# Patient Record
Sex: MALE | Race: WHITE | NOT HISPANIC OR LATINO | Employment: FULL TIME | URBAN - METROPOLITAN AREA
[De-identification: names, ages, dates, MRNs, and addresses within clinical notes are randomized per-mention and may not be internally consistent; named-entity substitution may affect disease eponyms.]

---

## 2024-12-15 ENCOUNTER — APPOINTMENT (OUTPATIENT)
Dept: RADIOLOGY | Facility: MEDICAL CENTER | Age: 59
End: 2024-12-15
Attending: EMERGENCY MEDICINE
Payer: COMMERCIAL

## 2024-12-15 ENCOUNTER — HOSPITAL ENCOUNTER (EMERGENCY)
Facility: MEDICAL CENTER | Age: 59
End: 2024-12-15
Attending: EMERGENCY MEDICINE
Payer: COMMERCIAL

## 2024-12-15 VITALS
WEIGHT: 186.73 LBS | DIASTOLIC BLOOD PRESSURE: 88 MMHG | BODY MASS INDEX: 27.58 KG/M2 | TEMPERATURE: 98.2 F | HEART RATE: 70 BPM | OXYGEN SATURATION: 98 % | RESPIRATION RATE: 16 BRPM | SYSTOLIC BLOOD PRESSURE: 138 MMHG
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
## 2024-12-15 DIAGNOSIS — S66.111A: ICD-10-CM

## 2024-12-15 PROCEDURE — 99284 EMERGENCY DEPT VISIT MOD MDM: CPT

## 2024-12-15 PROCEDURE — 73130 X-RAY EXAM OF HAND: CPT | Mod: LT

## 2024-12-15 ASSESSMENT — PAIN DESCRIPTION - PAIN TYPE: TYPE: ACUTE PAIN
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
## 2024-12-16 NOTE — DISCHARGE INSTRUCTIONS
Your x-rays did not show any fractures in the left hand or fingers.  You likely have a strain of the tendons of the index finger.  This should get better over the next week.  If you are still having pain in a week from now follow-up with [facility] orthopedic center.  Use ibuprofen and Tylenol to help with any discomfort.

## 2024-12-16 NOTE — ED NOTES
Pt ambulated to room from [facility]. Changed into gown. Agree with triage note. Chart up for ERP. Call light in reach.

## 2025-03-27 PROBLEM — M65.322 TRIGGER FINGER, LEFT INDEX FINGER: Status: ACTIVE | Noted: 2025-03-27

## 2025-03-27 NOTE — H&P (VIEW-ONLY)
HISTORY OF PRESENT ILLNESS:   Pranay is a 60 y.o. who presents to clinic for follow-up regarding left index trigger finger.He was seen by myself on 1/23/2025 and had an injection.  Since that time patient states, the injection worked for about 6-8 weeks. He now has increase in pain and stiffness in the morning and active triggering.      REVIEW OF SYSTEMS: Comprehensive review of systems is negative with the   exception of the aforementioned HPI.    PHYSICAL EXAMINATION:   General: Well-developed, well-nourished, in no acute distress.  Alert and oriented x 3   HEENT: Atraumatic, normocephalic, neck no visible thyromegaly  CV: Acyanotic  Pulmonary: Nonlabored operations, not in respiratory distress, symmetric chest rise  Abdomen: Nondistended  Musculoskeletal:  2+radial pulse  SILT radial/median/ulnar nerves  5/5 /intrinsic/wrist flexion-extension  Tender to palpation at the A1 pulley of the left index finger with active catching.      Encounter Diagnoses:   Trigger finger, left index finger    ASSESSMENT AND PLAN:    -Recurrent left index trigger finger  -Thorough discussion regarding diagnosis and treatment options.  We discussed repeat injection, nighttime bracing, or surgical release of the A1 pulley.  Patient would like to proceed with surgical release of the A1 pulley.  -The risks, benefits, and alternatives of surgical release of the A1 pulley was discussed at length.  These risks include but are not limited to: infection, pain, swelling, damage to surrounding neurovascular structures, recurrence of trigger finger, although this is unlikely, unsightly scarring, as well as the risks associated with anesthesia.  Patient understands these risks and would like to proceed.  They had an opportunity to ask questions, and all of their questions were answered to the best my ability.      Patient verbalized agreement and understanding with the plan.  They were advised to contact clinic if symptoms worsen or they have  any additional questions or concerns. All of their questions were answered to the best of my ability and they verbalized understanding.    Rosalba Clancy M.D.    ---  Please note that this dictation was created using voice recognition software. I have made every reasonable attempt to correct obvious errors, but I expect that there are errors of grammar and possibly content that I did not discover before finalizing the note.     Due to the word recognition system there will be spelling errors and word recognition errors that have no clinical impact on the intent of the note and treatment of the patient. I will attempt to correct the errors as the visit progresses however there will be missed attempts and I do reserve the right to correct the errors at any time in the future.

## 2025-04-02 ENCOUNTER — APPOINTMENT (OUTPATIENT)
Dept: ADMISSIONS | Facility: MEDICAL CENTER | Age: 60
End: 2025-04-02
Attending: ORTHOPAEDIC SURGERY
Payer: COMMERCIAL

## 2025-04-09 ENCOUNTER — PRE-ADMISSION TESTING (OUTPATIENT)
Dept: ADMISSIONS | Facility: MEDICAL CENTER | Age: 60
End: 2025-04-09
Attending: ORTHOPAEDIC SURGERY
Payer: COMMERCIAL

## 2025-04-18 ENCOUNTER — ANESTHESIA EVENT (OUTPATIENT)
Dept: SURGERY | Facility: MEDICAL CENTER | Age: 60
End: 2025-04-18
Payer: COMMERCIAL

## 2025-04-18 ENCOUNTER — ANESTHESIA (OUTPATIENT)
Dept: SURGERY | Facility: MEDICAL CENTER | Age: 60
End: 2025-04-18
Payer: COMMERCIAL

## 2025-04-18 ENCOUNTER — HOSPITAL ENCOUNTER (OUTPATIENT)
Facility: MEDICAL CENTER | Age: 60
End: 2025-04-18
Attending: ORTHOPAEDIC SURGERY | Admitting: ORTHOPAEDIC SURGERY
Payer: COMMERCIAL

## 2025-04-18 VITALS
HEIGHT: 69 IN | BODY MASS INDEX: 27.17 KG/M2 | TEMPERATURE: 96.2 F | HEART RATE: 58 BPM | RESPIRATION RATE: 18 BRPM | DIASTOLIC BLOOD PRESSURE: 92 MMHG | SYSTOLIC BLOOD PRESSURE: 155 MMHG | WEIGHT: 183.42 LBS | OXYGEN SATURATION: 94 %

## 2025-04-18 PROCEDURE — 160015 HCHG STAT PREOP MINUTES: Performed by: ORTHOPAEDIC SURGERY

## 2025-04-18 PROCEDURE — 160048 HCHG OR STATISTICAL LEVEL 1-5: Performed by: ORTHOPAEDIC SURGERY

## 2025-04-18 PROCEDURE — 160009 HCHG ANES TIME/MIN: Performed by: ORTHOPAEDIC SURGERY

## 2025-04-18 PROCEDURE — 26055 INCISE FINGER TENDON SHEATH: CPT | Mod: F1 | Performed by: ORTHOPAEDIC SURGERY

## 2025-04-18 PROCEDURE — 160025 RECOVERY II MINUTES (STATS): Performed by: ORTHOPAEDIC SURGERY

## 2025-04-18 PROCEDURE — 160002 HCHG RECOVERY MINUTES (STAT): Performed by: ORTHOPAEDIC SURGERY

## 2025-04-18 PROCEDURE — 700105 HCHG RX REV CODE 258: Performed by: ORTHOPAEDIC SURGERY

## 2025-04-18 PROCEDURE — 64702 REVISE FINGER/TOE NERVE: CPT | Mod: F1 | Performed by: ORTHOPAEDIC SURGERY

## 2025-04-18 PROCEDURE — 160035 HCHG PACU - 1ST 60 MINS PHASE I: Performed by: ORTHOPAEDIC SURGERY

## 2025-04-18 PROCEDURE — 160028 HCHG SURGERY MINUTES - 1ST 30 MINS LEVEL 3: Performed by: ORTHOPAEDIC SURGERY

## 2025-04-18 PROCEDURE — 160046 HCHG PACU - 1ST 60 MINS PHASE II: Performed by: ORTHOPAEDIC SURGERY

## 2025-04-18 PROCEDURE — 700101 HCHG RX REV CODE 250: Performed by: ANESTHESIOLOGY

## 2025-04-18 PROCEDURE — 700111 HCHG RX REV CODE 636 W/ 250 OVERRIDE (IP): Mod: JZ | Performed by: ANESTHESIOLOGY

## 2025-04-18 PROCEDURE — 160039 HCHG SURGERY MINUTES - EA ADDL 1 MIN LEVEL 3: Performed by: ORTHOPAEDIC SURGERY

## 2025-04-18 PROCEDURE — 700111 HCHG RX REV CODE 636 W/ 250 OVERRIDE (IP): Performed by: ORTHOPAEDIC SURGERY

## 2025-04-18 RX ORDER — DIPHENHYDRAMINE HYDROCHLORIDE 50 MG/ML
12.5 INJECTION, SOLUTION INTRAMUSCULAR; INTRAVENOUS
Status: DISCONTINUED | OUTPATIENT
Start: 2025-04-18 | End: 2025-04-18 | Stop reason: HOSPADM

## 2025-04-18 RX ORDER — SODIUM CHLORIDE, SODIUM LACTATE, POTASSIUM CHLORIDE, CALCIUM CHLORIDE 600; 310; 30; 20 MG/100ML; MG/100ML; MG/100ML; MG/100ML
INJECTION, SOLUTION INTRAVENOUS CONTINUOUS
Status: DISCONTINUED | OUTPATIENT
Start: 2025-04-18 | End: 2025-04-18 | Stop reason: HOSPADM

## 2025-04-18 RX ORDER — IPRATROPIUM BROMIDE AND ALBUTEROL SULFATE 2.5; .5 MG/3ML; MG/3ML
3 SOLUTION RESPIRATORY (INHALATION)
Status: DISCONTINUED | OUTPATIENT
Start: 2025-04-18 | End: 2025-04-18 | Stop reason: HOSPADM

## 2025-04-18 RX ORDER — HALOPERIDOL 5 MG/ML
1 INJECTION INTRAMUSCULAR
Status: DISCONTINUED | OUTPATIENT
Start: 2025-04-18 | End: 2025-04-18 | Stop reason: HOSPADM

## 2025-04-18 RX ORDER — CEFAZOLIN SODIUM 1 G/3ML
INJECTION, POWDER, FOR SOLUTION INTRAMUSCULAR; INTRAVENOUS PRN
Status: DISCONTINUED | OUTPATIENT
Start: 2025-04-18 | End: 2025-04-18 | Stop reason: SURG

## 2025-04-18 RX ORDER — OXYCODONE HCL 5 MG/5 ML
10 SOLUTION, ORAL ORAL
Status: DISCONTINUED | OUTPATIENT
Start: 2025-04-18 | End: 2025-04-18 | Stop reason: HOSPADM

## 2025-04-18 RX ORDER — HYDROMORPHONE HYDROCHLORIDE 1 MG/ML
1 INJECTION, SOLUTION INTRAMUSCULAR; INTRAVENOUS; SUBCUTANEOUS
Status: DISCONTINUED | OUTPATIENT
Start: 2025-04-18 | End: 2025-04-18 | Stop reason: HOSPADM

## 2025-04-18 RX ORDER — MIDAZOLAM HYDROCHLORIDE 1 MG/ML
1 INJECTION INTRAMUSCULAR; INTRAVENOUS
Status: DISCONTINUED | OUTPATIENT
Start: 2025-04-18 | End: 2025-04-18 | Stop reason: HOSPADM

## 2025-04-18 RX ORDER — HYDROMORPHONE HYDROCHLORIDE 1 MG/ML
0.4 INJECTION, SOLUTION INTRAMUSCULAR; INTRAVENOUS; SUBCUTANEOUS
Status: DISCONTINUED | OUTPATIENT
Start: 2025-04-18 | End: 2025-04-18 | Stop reason: HOSPADM

## 2025-04-18 RX ORDER — CEFAZOLIN SODIUM 1 G/3ML
2 INJECTION, POWDER, FOR SOLUTION INTRAMUSCULAR; INTRAVENOUS ONCE
Status: DISCONTINUED | OUTPATIENT
Start: 2025-04-18 | End: 2025-04-18 | Stop reason: HOSPADM

## 2025-04-18 RX ORDER — ONDANSETRON 2 MG/ML
4 INJECTION INTRAMUSCULAR; INTRAVENOUS
Status: DISCONTINUED | OUTPATIENT
Start: 2025-04-18 | End: 2025-04-18 | Stop reason: HOSPADM

## 2025-04-18 RX ORDER — OXYCODONE HCL 5 MG/5 ML
5 SOLUTION, ORAL ORAL
Status: DISCONTINUED | OUTPATIENT
Start: 2025-04-18 | End: 2025-04-18 | Stop reason: HOSPADM

## 2025-04-18 RX ORDER — FEXOFENADINE HCL 180 MG/1
180 TABLET ORAL DAILY
COMMUNITY

## 2025-04-18 RX ORDER — LIDOCAINE HYDROCHLORIDE 20 MG/ML
INJECTION, SOLUTION EPIDURAL; INFILTRATION; INTRACAUDAL; PERINEURAL PRN
Status: DISCONTINUED | OUTPATIENT
Start: 2025-04-18 | End: 2025-04-18 | Stop reason: SURG

## 2025-04-18 RX ORDER — HYDROMORPHONE HYDROCHLORIDE 1 MG/ML
0.2 INJECTION, SOLUTION INTRAMUSCULAR; INTRAVENOUS; SUBCUTANEOUS
Status: DISCONTINUED | OUTPATIENT
Start: 2025-04-18 | End: 2025-04-18 | Stop reason: HOSPADM

## 2025-04-18 RX ORDER — BUPIVACAINE HYDROCHLORIDE 2.5 MG/ML
INJECTION, SOLUTION EPIDURAL; INFILTRATION; INTRACAUDAL; PERINEURAL
Status: DISCONTINUED | OUTPATIENT
Start: 2025-04-18 | End: 2025-04-18 | Stop reason: HOSPADM

## 2025-04-18 RX ORDER — KETOROLAC TROMETHAMINE 15 MG/ML
INJECTION, SOLUTION INTRAMUSCULAR; INTRAVENOUS PRN
Status: DISCONTINUED | OUTPATIENT
Start: 2025-04-18 | End: 2025-04-18 | Stop reason: SURG

## 2025-04-18 RX ORDER — MEPERIDINE HYDROCHLORIDE 25 MG/ML
12.5 INJECTION INTRAMUSCULAR; INTRAVENOUS; SUBCUTANEOUS
Status: DISCONTINUED | OUTPATIENT
Start: 2025-04-18 | End: 2025-04-18 | Stop reason: HOSPADM

## 2025-04-18 RX ADMIN — PROPOFOL 20 MG: 10 INJECTION, EMULSION INTRAVENOUS at 09:03

## 2025-04-18 RX ADMIN — CEFAZOLIN 2 G: 1 INJECTION, POWDER, FOR SOLUTION INTRAMUSCULAR; INTRAVENOUS at 08:47

## 2025-04-18 RX ADMIN — PROPOFOL 20 MG: 10 INJECTION, EMULSION INTRAVENOUS at 09:07

## 2025-04-18 RX ADMIN — LIDOCAINE HYDROCHLORIDE 50 MG: 20 INJECTION, SOLUTION EPIDURAL; INFILTRATION; INTRACAUDAL; PERINEURAL at 08:54

## 2025-04-18 RX ADMIN — PROPOFOL 20 MG: 10 INJECTION, EMULSION INTRAVENOUS at 08:59

## 2025-04-18 RX ADMIN — SODIUM CHLORIDE, POTASSIUM CHLORIDE, SODIUM LACTATE AND CALCIUM CHLORIDE: 600; 310; 30; 20 INJECTION, SOLUTION INTRAVENOUS at 08:45

## 2025-04-18 RX ADMIN — PROPOFOL 20 MG: 10 INJECTION, EMULSION INTRAVENOUS at 09:11

## 2025-04-18 RX ADMIN — KETOROLAC TROMETHAMINE 15 MG: 15 INJECTION, SOLUTION INTRAMUSCULAR; INTRAVENOUS at 09:28

## 2025-04-18 RX ADMIN — PROPOFOL 120 MG: 10 INJECTION, EMULSION INTRAVENOUS at 08:54

## 2025-04-18 ASSESSMENT — PAIN SCALES - GENERAL: PAIN_LEVEL: 0

## 2025-04-18 NOTE — INTERVAL H&P NOTE
Consented Procedure: Left index finger A1 pulley release and radial digital nerve neuroma excision vs exploration  I have examined the patient, provided the risks, benefits, and alternatives to the procedure(s) indicated on the signed consent form, and the patient wishes to proceed.    H&P updated. The patient was examined and patient has a left index radial digital nerve mass that is exquisitely tender to palpation. We discussed that this area could be a post-traumatic neuroma and the risks/benefits/alternatives to nerve exploration and possible neuroma excision. Patient would like to proceed with both left index finger A1 pulley release and radial digital nerve exploration with possible neuroma excision.       Rosalba Clancy M.D.  04/18/25 7:24 AM

## 2025-04-18 NOTE — OR NURSING
0931: Pt arrived via gurney with Dr. Clancy, anesthesia and RN. Report received. See flowsheet for VS. Dressing CDI. Pt awake and alert. Orders reviewed and initiated.   1010: Dr. Tucker notified of HTN. Ok to proceed with discharge.   1019: Report called to Thu, RN. All questions answered. VSS. Dressing CDI. No patient distress. Alert and oriented x4. Pt transported to phase 2 in stable condition on room air with no personal belongings. Family updated.

## 2025-04-18 NOTE — PROGRESS NOTES
Medication history reviewed with PT at bedside    [facility] is complete per PT reporting    Allergies reviewed.     Patient denies any outpatient antibiotics in the last 30 days.     Patient is not taking anticoagulants.    Dispense history is not available in [facility].    Preferred pharmacy for this visit - [facility] on [address] ([phone])

## 2025-04-18 NOTE — LETTER
March 28, 2025    Patient Name: Pranay Foster  Surgeon Name: Rosalba Clancy M.D.  Surgery Facility: [facility] ([address] [place] [address])  Surgery Date: 4/18/2025    The time of your surgery is not final and may change up to and until the day of your surgery. You will be contacted 24-48 hours prior to your surgery date with your check-in and surgery time.    If you will not be at one of the below numbers please call the surgery scheduler at  [phone]  Preferred Phone: [phone]    BEFORE YOUR SURGERY   Pre Registration and/or Lab Work must be done within and no earlier than 28 days prior to your surgery date. Your scheduled facility will contact you regarding all required preregistration and/or lab work. If you have not been contacted within 7 days of your scheduled procedure please call [facility] at [phone] for an appointment as soon as possible.    DAY OF YOUR SURGERY  Nothing to eat or drink after midnight     Refrain from smoking any substance after midnight prior to surgery. Smoking may interfere with the anesthetic and frequently produces nausea during the recovery period.    Continue taking all lifesaving medications. Including the morning of your surgery with small sip of water.    Please do NOT take on the day of surgery:  Diuretics: examples- furosemide (Lasix), spironolactone, hydrochlorothiazide  ACE-inhibitors: examples- lisinopril, ramipril, enalapril  “ARBs”: examples- losartan, Olmesartan, valsartan    Please arrive at the hospital/surgery center at the check-in time provided.     An adult will need to bring you and take you home after your surgery.     AFTER YOUR SURGERY  Post op Appointment:   Date: 05/01/25   Time: 11:00 AM   With: Rosalba Clancy MD   Location: [address] [place] [id number]    - Post Surgery - You will need someone to drive you home  - Post Surgery - You will need someone to stay with you for 24 hours     TIME OFF WORK  [place] or  Disability forms can be faxed directly to: [phone] or you may drop them off at [address] [place] [address]. Our office charges a $35.00 fee per form. Forms will be completed within 10 business days of drop off and payment received. For the status of your forms you may contact our disability office directly [phone].    MEDICATION INSTRUCTIONS **Please read section completely**  The following medications should be stopped a minimum of 10 days prior to surgery:  All over the counter, Supplements & Herbal medications    Anorectics: Phentermine (Adipex-P, Lomaira and Suprenza), Phentermine-topiramate (Qsymia), Bupropion-naltrexone (Contrave)    **If you are on Bupropion for anxiety/depression, please continue this medication up until the day of surgery.     Opiod Partial Agonists/Opioid Antagonists: Buprenorphine (Suboxone, Belbuca, Butrans, Probuphine Implant, Sublocade), Naltrexone (ReVia, Vivitrol), Naloxone    Amphetamines: Dextroamphetamine/Amphetamine (Adderall, Mydayis), Methylphenidate Hydrochloride (Concerta, Metadate, Methylin, Ritalin)    The following medications should be stopped 5 days prior to surgery:  Blood Thinners: Any Aspirin, Aspirin products, anti-inflammatories such as ibuprofen and any blood thinners such as Coumadin and Plavix. Please consult your prescribing physician if you are on life saving blood thinners, in regards to when to stop medications prior to surgery.     The following medications should be stopped a minimum of 3 days prior to surgery:  PDE-5 inhibitors: Sildenafil (Viagra), Tadalafil (Cialis), Vardenafil (Levitra), Avanafil (Stendra)    MAO Inhibitors: Rasagiline (Azilect), Selegiline (Eldepryl, Emsam, Selapar), Isocarboxazid (Marplan), Phenelzine (Nardil)       COVID and INFLUENZA NOTICE TO PATIENTS    Currently, the [facility] does not routinely test patients for COVID-19 or Influenza prior to their elective surgery.  However, if  patients develop the following symptoms prior to their surgery date, they should voluntarily test for COVID-19 and Influenza and notify the surgical office of their condition and results.  The symptoms warranting testing would be any two of the following:    Fever (Temp above 100.4 F)  Chills  Cough  Shortness of breath or difficulty breathing  Fatigue  Myalgias (muscle or body aches)  Headache  Sore Throat  Congestion or Runny Nose  Nausea or vomiting  Diarrhea  New loss of taste or smell    Having these symptoms prior to surgery can significantly increase your risk of morbidity and mortality under anesthesia, which may compromise your health and require a transfer to a hospital for a higher level of care.  Therefore, it is advisable to notify the surgical team of any illness in order to get information for the appropriate time to delay the surgery to minimize these preventable risks.    Your health and safety are our number one priority at the [facility], and we are thankful that you entrust us with your care.  Please help us ensure the best possible surgical and anesthetic outcome by sharing appropriate health information with our perioperative team and staff.  We look forward to taking great care of you!    Thank you for your time and consideration on this matter.    Elroy Waller MD  Medical Director  Anesthesiologist  [facility]

## 2025-04-18 NOTE — ANESTHESIA TIME REPORT
Anesthesia Start and Stop Event Times       Date Time Event    4/18/2025 0839 Anesthesia Start     0934 Anesthesia Stop          Responsible Staff  04/18/25      Name Role Begin End    Shady Tucker III, M.D. Anesth 0839 0909          Overtime Reason:  no overtime (within assigned shift)    Comments:

## 2025-04-18 NOTE — OR NURSING
1025 Pt arrives to phase II from PACU. VSS. Pt reports 0/10 pain. Dressing to LUE hand clean, dry, and intact.     1040 Discharge instructions reviewed with pt and wife . Both verbalize understanding. Copy of instructions sent home with pt    1045 IV removed. Dressing remains clean, dry, and intact. VSS. Pt dressed independently    1048 Pt ambulated to car w/ wife with all belongings

## 2025-04-18 NOTE — OP REPORT
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 - Primary           ANESTHESIA: General           ESTIMATED BLOOD LOSS: Less than 5 cc                   SPECIMENS: None           COMPLICATIONS: None apparent           CONDITION: stable to PACU    INDICATIONS:          This is a pleasant 60 y.o. male who had symptomatic left index finger triggering.  He was treated conservatively with a injection however his triggering recurred and this was painful and limiting his activities of daily living.  We discussed repeat injection versus open release of the A1 pulley.  After discussing the risks, benefits, and alternatives he elected to proceed with A1 pulley release.  In the preoperative holding area patient mentioned that his left index finger had an area of tense tenderness to palpation along the radial digital nerve that developed within the past week or so.  He had an area of exquisite tenderness along his radial digital nerve a positive Tinel's.  We discussed digital nerve exploration and possible neuroma excision, if there was a neuroma visualized in the operating room.  We discussed that there may not be a visible neuroma to excise and that his symptoms may not resolve or he may have numbness along the radial border of his digit..     The risks, benefits, and alternatives of surgical intervention were discussed with the patient at length, including, but not limited to: infection, pain, swelling, weakness, numbness, recurrence of the triggering, incomplete release of the A1 pulley, with respect to the digital nerve we specifically discussed permanent or temporary numbness, lack of resolution of symptoms, permanent or  temporary dysfunction in the affected limb, bruising, need for additional surgical interventions, need for additional treatment, need for rehabilitation, blood clot, heart attack, or problems with anesthesia.      Surgical intervention recommendations and details were discussed at length with the patient.  They had an opportunity to ask questions and all of their questions were answered to the best of my ability and to their satisfaction. Patient verbalized understanding and would like to proceed with surgical intervention.     PROCEDURE:  Patient was met in the preoperative holding area, correct site was marked, informed signed consent was obtained.  Patient was taken to the operating room placed  supine on the operating room table.  Timeout per protocol was performed, monitored anesthesia was provided, antibiotics were administered.  The left upper extremity was prepped and draped in the usual sterile fashion.  Digital block was performed with quarter percent bupivacaine plain.  An incision over the left index finger A1 pulley was made, dissecting down through the subcutaneous tissue the A1 pulley was identified and released longitudinally.  Patient was able to make a full composite fist without catching or clicking there is no palpable clicking or catching.  A mid lateral incision was made directly over the area of intense tenderness along the radial digital nerve of the left index finger.  The neurovascular bundle was identified but there was no palpable or visual mass along the nerve.  Neurolysis was performed.  The wounds were irrigated.  Hemostasis was obtained.  The wounds were closed with nylon sterile dressing was applied patient was awoken from anesthesia and taken to recovery in stable condition there were no apparent complications instrument counts were correct x 2.    POST-OPERATIVE PLAN:  -Discharge home when meets criteria  -Over-the-counter acetaminophen and an NSAID such as ibuprofen per bottle  direction for pain management  -Ice and elevation  -Keep dressing in place for 3 days then okay to gently wash hands with soap and water.  Pat dry.  No soaking or submerging incision.  -Follow-up in clinic as scheduled.  -Call clinic with any questions or concerns.

## 2025-04-18 NOTE — ANESTHESIA POSTPROCEDURE EVALUATION
Patient: Pranay Foster    Procedure Summary       Date: 04/18/25 Room / Location: [place] [address] / SURGERY [facility]    Anesthesia Start: 0839 Anesthesia Stop: 0934    Procedure: Left index finger A1 pulley release, distal nerve exploration (Left: Hand) Diagnosis:       Trigger finger, left index finger      (Trigger finger, left index finger)    Surgeons: Rosalba Clancy M.D. Responsible Provider: Shady Tucker III, M.D.    Anesthesia Type: general ASA Status: 1            Final Anesthesia Type: general  Last vitals  BP   Blood Pressure: (!) 155/92    Temp   (!) 35.7 °C (96.2 °F)    Pulse   (!) 58   Resp   18    SpO2   94 %      Anesthesia Post Evaluation    Patient location during evaluation: PACU  Patient participation: complete - patient participated  Level of consciousness: awake and alert  Pain score: 0    Airway patency: patent  Anesthetic complications: no  Cardiovascular status: hemodynamically stable  Respiratory status: acceptable  Hydration status: euvolemic    PONV: none          No notable events documented.     Nurse Pain Score: 0 (NPRS)

## 2025-04-18 NOTE — ANESTHESIA PREPROCEDURE EVALUATION
Case: [id number] Date/Time: 04/18/25 0815    Procedure: Left index finger A1 pulley release (Left)    Diagnosis: Trigger finger, left index finger [M65.322]    Pre-op diagnosis: Trigger finger, left index finger [M65.322]    Location: [place] [address] / SURGERY [facility]    Surgeons: Rosalba Clancy M.D.            Relevant Problems   No relevant active problems       Physical Exam    Airway   Mallampati: III  TM distance: >3 FB  Neck ROM: limited       Cardiovascular - normal exam  Rhythm: regular  Rate: normal  (-) murmur     Dental - normal exam           Pulmonary - normal exam  Breath sounds clear to auscultation     Abdominal    Neurological - normal exam                   Anesthesia Plan    ASA 1       Plan - general       Airway plan will be natural airway    (Propofol TIVA)      Induction: intravenous    Postoperative Plan: Postoperative administration of opioids is intended.    Pertinent diagnostic labs and testing reviewed    Informed Consent:    Anesthetic plan and risks discussed with patient.    Use of blood products discussed with: patient whom consented to blood products.

## 2025-04-18 NOTE — DISCHARGE INSTRUCTIONS
HOME CARE INSTRUCTIONS    ACTIVITY: Rest and take it easy for the first 24 hours.  A responsible adult is recommended to remain with you during that time.  It is normal to feel sleepy.  We encourage you to not do anything that requires balance, judgment or coordination.    FOR 24 HOURS DO NOT:  Drive, operate machinery or run household appliances.  Drink beer or alcoholic beverages.  Make important decisions or sign legal documents.    SPECIAL INSTRUCTIONS:     POST-OPERATIVE INSTRUCTIONS    Pain Management:  After any operation, a certain degree of pain is to be expected. Take Advil (ibuprofen) and Extra Strength Tylenol (acetaminophen) as first line medications for mild to moderate pain. Refer to dosing instructions on the bottle, but, in general, it is safe to take ibuprofen 200-800mg every 8 hours with food and acetaminophen 500mg-1000mg every 8 hours. Do not take these medications if you are allergic or they interact with other medications you take. Do not take ibuprofen with other non-steroidal anti-inflammatory (NSAIDs) medications. For most small procedures, this should be enough to keep you comfortable. Take these medications if you are having pain.     When you go home, please keep your operated extremity elevated at all times (above the level of your heart).  If you do this, your swelling will resolve more quickly and your pain will be improve more quickly as well. You may also place an ice pack over your dressing or splint to help with swelling and pain.    Diet: Resume Regular diet    Activity:  No lifting more than 5lbs for 2 weeks    Dressing Management:  For small hand procedures such as trigger finger release, the dressing that you have on your extremity should remain on for 3-4 days. It may then be removed, you can wash the incision gently with soap and water, and keep the incision covered with a band-aid or similar clean dressing.  If the dressing becomes wet prior to 3 or 4 days, it is okay to  remove the dressing and cover with a Band-Aid.  A small amount of blood on the dressings is to be expected.     Follow Up:  Follow up after surgery is typically 10-14 days, unless you were specifically instructed otherwise. This appointment is made at time of surgical scheduling.     If you have questions regarding your surgery that you feel requires attention before your scheduled appointment, please call the office at [phone] during business hours, or [phone] after hours for the answering service. If you feel that you have a surgical emergency postoperatively that requires immediate attention, please call the above numbers or go to the Emergency Department and ask for the Orthopedic Surgeon on call.     DIET: To avoid nausea, slowly advance diet as tolerated, avoiding spicy or greasy foods for the first day.  Add more substantial food to your diet according to your physician's instructions.  Babies can be fed formula or breast milk as soon as they are hungry.  INCREASE FLUIDS AND FIBER TO AVOID CONSTIPATION.    MEDICATIONS: Resume taking daily medication.  Take prescribed pain medication with food.  If no medication is prescribed, you may take non-aspirin pain medication if needed.  PAIN MEDICATION CAN BE VERY CONSTIPATING.  Take a stool softener or laxative such as senokot, pericolace, or milk of magnesia if needed.    A follow-up appointment should be arranged with your doctor in; call to schedule.    You should CALL YOUR PHYSICIAN if you develop:  Fever greater than 101 degrees F.  Pain not relieved by medication, or persistent nausea or vomiting.  Excessive bleeding (blood soaking through dressing) or unexpected drainage from the wound.  Extreme redness or swelling around the incision site, drainage of pus or foul smelling drainage.  Inability to urinate or empty your bladder within 8 hours.  Problems with breathing or chest pain.    You should call 911 if you develop problems with breathing or chest  pain.  If you are unable to contact your doctor or surgical center, you should go to the nearest emergency room or urgent care center.  Physician's telephone #: Dr Clancy [phone]     MILD FLU-LIKE SYMPTOMS ARE NORMAL.  YOU MAY EXPERIENCE GENERALIZED MUSCLE ACHES, THROAT IRRITATION, HEADACHE AND/OR SOME NAUSEA.    If any questions arise, call your doctor.  If your doctor is not available, please feel free to call the Surgical Center at [phone].  The Center is open Monday through Friday from 7AM to 7PM.      A registered nurse may call you a few days after your surgery to see how you are doing after your procedure.    You may also receive a survey in the mail within the next two weeks and we ask that you take a few moments to complete the survey and return it to us.  Our goal is to provide you with very good care and we value your comments.     Depression / Suicide Risk    As you are discharged from this [facility] facility, it is important to learn how to keep safe from harming yourself.    Recognize the warning signs:  Abrupt changes in personality, positive or negative- including increase in energy   Giving away possessions  Change in eating patterns- significant weight changes-  positive or negative  Change in sleeping patterns- unable to sleep or sleeping all the time   Unwillingness or inability to communicate  Depression  Unusual sadness, discouragement and loneliness  Talk of wanting to die  Neglect of personal appearance   Rebelliousness- reckless behavior  Withdrawal from people/activities they love  Confusion- inability to concentrate     If you or a loved one observes any of these behaviors or has concerns about self-harm, here's what you can do:  Talk about it- your feelings and reasons for harming yourself  Remove any means that you might use to hurt yourself (examples: pills, rope, extension cords, firearm)  Get professional help from the community (Mental Health, Substance Abuse,  psychological counseling)  Do not be alone:Call your Safe Contact- someone whom you trust who will be there for you.  Call your local CRISIS HOTLINE 505-3282 or 371-414-9641  Call your local Children's Mobile Crisis Response Team [place] (163) 172-2977 or www.[employer]  Call the toll free National Suicide Prevention Hotlines   National Suicide Prevention Lifeline 810-895-KLHV (4481)  [place] [facility] [facility] 800-SUICIDE (917-4307)    I acknowledge receipt and understanding of these Home Care instructions.

## 2025-04-18 NOTE — OR SURGEON
Immediate Post OP Note    PreOp Diagnosis: Left index finger trigger finger, radial digital nerve neuropathy      PostOp Diagnosis: Left index finger trigger finger, radial digital nerve neuropathy      Procedure(s):  Left index finger A1 pulley release, digital nerve exploration - Wound Class: Clean    Surgeon(s):  Rosalba Clancy M.D.    Anesthesiologist/Type of Anesthesia:  Anesthesiologist: Shady Tucker III, M.D./General    Surgical Staff:  Circulator: Nicki Astudillo R.N.  Relief Scrub: Brant Cardenas  Scrub Person: Shaheen Saha    Specimens removed if any:  * No specimens in log *    Estimated Blood Loss: Less than 5 cc    Findings: See operative note    Complications: None apparent        4/18/2025 9:38 AM Rosalba Clancy M.D.

## (undated) DEVICE — GLOVE BIOGEL PI INDICATOR SZ 8.0 SURGICAL PF LF -(50/BX 4BX/CA)

## (undated) DEVICE — LACTATED RINGERS INJ 1000 ML - (14EA/CA 60CA/PF)

## (undated) DEVICE — SET EXTENSION WITH 2 PORTS (48EA/CA) ***PART #2C8610 IS A SUBSTITUTE*****

## (undated) DEVICE — CHLORAPREP 26 ML APPLICATOR - ORANGE TINT(25/CA)

## (undated) DEVICE — BLADE SURGICAL #15 - (50/BX 3BX/CA)

## (undated) DEVICE — BANDAGE STERILE 2 IN X 75 IN (12EA/BX 8BX/CA)

## (undated) DEVICE — CANISTER SUCTION 3000ML MECHANICAL FILTER AUTO SHUTOFF MEDI-VAC NONSTERILE LF DISP (40EA/CA)

## (undated) DEVICE — GLOVE SZ 7.5 BIOGEL PI MICRO - PF LF (50PR/BX)

## (undated) DEVICE — SENSOR OXIMETER ADULT SPO2 RD SET (20EA/BX)

## (undated) DEVICE — SODIUM CHL IRRIGATION 0.9% 1000ML (12EA/CA)

## (undated) DEVICE — GLOVE BIOGEL SZ 6.5 SURGICAL PF LTX (50PR/BX 4BX/CA)

## (undated) DEVICE — GLOVE SZ 7 BIOGEL PI MICRO - PF LF (50PR/BX 4BX/CA)

## (undated) DEVICE — COVER LIGHT HANDLE ALC PLUS DISP (18EA/BX)

## (undated) DEVICE — SUCTION INSTRUMENT YANKAUER BULBOUS TIP W/O VENT (50EA/CA)

## (undated) DEVICE — PACK UPPER EXTREMITY (2EA/CA)

## (undated) DEVICE — GLOVE SIZE 7.0 SURGEON ACCELERATOR FREE GREEN (50PR/BX 4BX/CA)

## (undated) DEVICE — GOWN WARMING STANDARD FLEX - (30/CA)

## (undated) DEVICE — SUTURE GENERAL

## (undated) DEVICE — TUBING CLEARLINK DUO-VENT - C-FLO (48EA/CA)

## (undated) DEVICE — CORDS BIPOLAR COAGULATION - 12FT STERILE DISP. (10EA/BX)

## (undated) DEVICE — GLOVE BIOGEL PI INDICATOR SZ 6.0 SURGICAL PF LF -(200PR/CA)

## (undated) DEVICE — SUTURE 3-0 ETHILON FS-1 - (36/BX) 30 INCH

## (undated) DEVICE — GLOVE SZ 6.5 BIOGEL PI MICRO - PF LF (50PR/BX)

## (undated) DEVICE — GLOVE BIOGEL INDICATOR SZ 6.5 SURGICAL PF LTX - (50PR/BX 4BX/CA)

## (undated) DEVICE — BANDAGE ELASTIC 2 X 5 YDS - STERILE VELCRO (25/CA) LATEX

## (undated) DEVICE — SET LEADWIRE 5 LEAD BEDSIDE DISPOSABLE ECG (1SET OF 5/EA)